# Patient Record
Sex: FEMALE | Race: BLACK OR AFRICAN AMERICAN | URBAN - METROPOLITAN AREA
[De-identification: names, ages, dates, MRNs, and addresses within clinical notes are randomized per-mention and may not be internally consistent; named-entity substitution may affect disease eponyms.]

---

## 2022-08-24 ENCOUNTER — WEB ENCOUNTER (OUTPATIENT)
Dept: URBAN - METROPOLITAN AREA CLINIC 84 | Facility: CLINIC | Age: 57
End: 2022-08-24

## 2022-08-24 ENCOUNTER — DASHBOARD ENCOUNTERS (OUTPATIENT)
Age: 57
End: 2022-08-24

## 2022-08-24 ENCOUNTER — OFFICE VISIT (OUTPATIENT)
Dept: URBAN - METROPOLITAN AREA CLINIC 84 | Facility: CLINIC | Age: 57
End: 2022-08-24
Payer: OTHER GOVERNMENT

## 2022-08-24 VITALS
HEIGHT: 67 IN | WEIGHT: 293 LBS | HEART RATE: 77 BPM | TEMPERATURE: 98.1 F | DIASTOLIC BLOOD PRESSURE: 81 MMHG | SYSTOLIC BLOOD PRESSURE: 119 MMHG | BODY MASS INDEX: 45.99 KG/M2

## 2022-08-24 DIAGNOSIS — M32.9 SYSTEMIC LUPUS ERYTHEMATOSUS, UNSPECIFIED SLE TYPE, UNSPECIFIED ORGAN INVOLVEMENT STATUS: ICD-10-CM

## 2022-08-24 DIAGNOSIS — I10 HYPERTENSION, UNSPECIFIED TYPE: ICD-10-CM

## 2022-08-24 DIAGNOSIS — E66.01 MORBID OBESITY: ICD-10-CM

## 2022-08-24 DIAGNOSIS — Z86.010 HISTORY OF COLONIC POLYPS: ICD-10-CM

## 2022-08-24 DIAGNOSIS — E11.9 TYPE 2 DIABETES MELLITUS WITHOUT COMPLICATIONS: ICD-10-CM

## 2022-08-24 DIAGNOSIS — Z79.4 LONG TERM (CURRENT) USE OF INSULIN: ICD-10-CM

## 2022-08-24 DIAGNOSIS — E78.5 HYPERLIPIDEMIA, UNSPECIFIED HYPERLIPIDEMIA TYPE: ICD-10-CM

## 2022-08-24 PROBLEM — 710815001: Status: ACTIVE | Noted: 2022-08-24

## 2022-08-24 PROBLEM — 313436004: Status: ACTIVE | Noted: 2022-08-24

## 2022-08-24 PROBLEM — 38341003: Status: ACTIVE | Noted: 2022-08-24

## 2022-08-24 PROBLEM — 55822004: Status: ACTIVE | Noted: 2022-08-24

## 2022-08-24 PROBLEM — 238136002: Status: ACTIVE | Noted: 2022-08-24

## 2022-08-24 PROBLEM — 55464009: Status: ACTIVE | Noted: 2022-08-24

## 2022-08-24 PROCEDURE — 99202 OFFICE O/P NEW SF 15 MIN: CPT | Performed by: INTERNAL MEDICINE

## 2022-08-24 RX ORDER — VALACYCLOVIR 500 MG/1
TAKE 1 TABLET BY MOUTH TWICE DAILY FOR 10 DAYS TABLET, FILM COATED ORAL
Qty: 30 EACH | Refills: 0 | Status: ACTIVE | COMMUNITY

## 2022-08-24 RX ORDER — MELOXICAM 15 MG/1
TABLET ORAL
Qty: 90 TABLET | Status: DISCONTINUED | COMMUNITY

## 2022-08-24 RX ORDER — IBUPROFEN 800 MG/1
TABLET, FILM COATED ORAL
Qty: 30 TABLET | Status: DISCONTINUED | COMMUNITY

## 2022-08-24 RX ORDER — LOSARTAN POTASSIUM 25 MG/1
TABLET, FILM COATED ORAL
Qty: 90 TABLET | Status: ACTIVE | COMMUNITY

## 2022-08-24 RX ORDER — FLUCONAZOLE 150 MG/1
TAKE 1 TABLET BY MOUTH ONCE A WEEK ON TUESDAY TABLET ORAL
Qty: 4 EACH | Refills: 0 | Status: DISCONTINUED | COMMUNITY

## 2022-08-24 RX ORDER — TERCONAZOLE 8 MG/G
CREAM VAGINAL
Qty: 20 GRAM | Status: DISCONTINUED | COMMUNITY

## 2022-08-24 RX ORDER — DULAGLUTIDE 1.5 MG/.5ML
ADMINISTER 1.5 MG UNDER THE SKIN 1 TIME A WEEK INJECTION, SOLUTION SUBCUTANEOUS
Qty: 2 MILLILITER | Refills: 0 | Status: ACTIVE | COMMUNITY

## 2022-08-24 RX ORDER — TIZANIDINE 4 MG/1
TABLET ORAL
Qty: 40 TABLET | Status: DISCONTINUED | COMMUNITY

## 2022-08-24 RX ORDER — HYDROXYCHLOROQUINE SULFATE 200 MG/1
TAKE 1 TABLET BY MOUTH TWICE DAILY TABLET, FILM COATED ORAL
Qty: 180 EACH | Refills: 0 | Status: ACTIVE | COMMUNITY

## 2022-08-24 RX ORDER — FUROSEMIDE 20 MG/1
TAKE 1 TABLET BY MOUTH EVERY DAY FOR 20 DAYS AS NEEDED. USE SPARINGLY TABLET ORAL
Qty: 20 EACH | Refills: 0 | Status: DISCONTINUED | COMMUNITY

## 2022-08-24 RX ORDER — BENZONATATE 200 MG/1
CAPSULE ORAL
Qty: 20 CAPSULE | Status: DISCONTINUED | COMMUNITY

## 2022-08-24 RX ORDER — DAPAGLIFLOZIN 10 MG/1
TABLET, FILM COATED ORAL
Qty: 90 TABLET | Status: ACTIVE | COMMUNITY

## 2022-08-24 RX ORDER — KETOCONAZOLE 20 MG/ML
SHAMPOO, SUSPENSION TOPICAL
Qty: 120 MILLILITER | Status: DISCONTINUED | COMMUNITY

## 2022-08-24 RX ORDER — AZITHROMYCIN 250 MG/1
TABLET, FILM COATED ORAL
Qty: 6 TABLET | Status: DISCONTINUED | COMMUNITY

## 2022-08-24 RX ORDER — ATORVASTATIN CALCIUM 40 MG/1
TAKE 1 TABLET BY MOUTH EVERY DAY TABLET ORAL
Qty: 90 EACH | Refills: 0 | Status: ACTIVE | COMMUNITY

## 2022-08-24 RX ORDER — DICLOFENAC SODIUM 10 MG/G
GEL TOPICAL
Qty: 100 GRAM | Refills: 0 | Status: ACTIVE | COMMUNITY

## 2022-08-24 RX ORDER — PREDNISONE 20 MG/1
TABLET ORAL
Qty: 30 TABLET | Status: ACTIVE | COMMUNITY

## 2022-08-24 RX ORDER — METHYLPREDNISOLONE 4 MG/1
FOLLOW PACKAGE DIRECTIONS TABLET ORAL
Qty: 21 EACH | Refills: 0 | Status: DISCONTINUED | COMMUNITY

## 2022-08-24 RX ORDER — ORPHENADRINE CITRATE 100 MG/1
TAKE 1 TABLET BY MOUTH TWICE DAILY AS NEEDED. WILL NOT CAUSE SLEEPINESS TABLET, EXTENDED RELEASE ORAL
Qty: 60 EACH | Refills: 0 | Status: ACTIVE | COMMUNITY

## 2022-08-24 RX ORDER — FLUOCINONIDE 0.5 MG/ML
MASSAGE INTO SCALP TWICE DAILY SOLUTION TOPICAL
Qty: 120 MILLILITER | Refills: 0 | Status: DISCONTINUED | COMMUNITY

## 2022-08-24 RX ORDER — BUPROPION HYDROCHLORIDE 150 MG/1
TABLET, EXTENDED RELEASE ORAL
Qty: 30 TABLET | Status: DISCONTINUED | COMMUNITY

## 2022-08-24 RX ORDER — ALPRAZOLAM 1 MG/1
TAKE 1 TABLET BY MOUTH EVERY DAY AS NEEDED TABLET ORAL
Qty: 10 EACH | Refills: 0 | Status: DISCONTINUED | COMMUNITY

## 2022-08-24 RX ORDER — GABAPENTIN 600 MG/1
TABLET ORAL
Qty: 90 TABLET | Status: ACTIVE | COMMUNITY

## 2022-08-24 NOTE — PHYSICAL EXAM CONSTITUTIONAL:
Morbidly obese female, in no acute distress , ambulating without difficulty , normal communication ability

## 2022-08-25 PROBLEM — 428283002: Status: ACTIVE | Noted: 2022-08-24

## 2022-09-08 ENCOUNTER — OFFICE VISIT (OUTPATIENT)
Dept: URBAN - METROPOLITAN AREA SURGERY CENTER 20 | Facility: SURGERY CENTER | Age: 57
End: 2022-09-08

## 2022-09-22 ENCOUNTER — OFFICE VISIT (OUTPATIENT)
Dept: URBAN - METROPOLITAN AREA SURGERY CENTER 20 | Facility: SURGERY CENTER | Age: 57
End: 2022-09-22